# Patient Record
Sex: MALE | Race: AMERICAN INDIAN OR ALASKA NATIVE
[De-identification: names, ages, dates, MRNs, and addresses within clinical notes are randomized per-mention and may not be internally consistent; named-entity substitution may affect disease eponyms.]

---

## 2018-10-25 ENCOUNTER — HOSPITAL ENCOUNTER (OUTPATIENT)
Dept: HOSPITAL 5 - VAS | Age: 58
Discharge: HOME | End: 2018-10-25
Attending: PSYCHIATRY & NEUROLOGY
Payer: MEDICARE

## 2018-10-25 DIAGNOSIS — I65.23: Primary | ICD-10-CM

## 2018-10-25 PROCEDURE — 93880 EXTRACRANIAL BILAT STUDY: CPT

## 2018-10-26 NOTE — VASCULAR LAB REPORT
CAROTID DUPLEX STUDY:



RIGHT        PSVEDV

CCA PROX:[id number]

CCA DIST:[id number]

ICA PROX:[id number]

ICA MID:[id number]

ICA DIST:[id number]

ECA:         102

VERT:              41              15  



LEFT         PSVEDV

CCA PROX:[id number]

CCA DIST:[id number]

ICA PROX:[id number]

ICA MID:[id number]

ICA DIST:[id number]

ECA:               112

VERT:              36        9





REASON FOR EXAM: Carotid artery stenosis.

     

COMMENTS ON THE RIGHT:

Doppler frequency analysis is consistent with 16 to 49 percent diameter 

reduction of the internal carotid artery.  A small amount of mixed 

density plaque is seen.  The common carotid artery is patent. The 

external carotid artery is patent.  The vertebral artery has antegrade 

flow.



COMMENTS ON THE LEFT:

Doppler frequency analysis is consistent with 16 to 49 percent diameter 

reduction of the internal carotid artery.  A small amount of mixed 

density plaque is seen.  The common carotid artery is patent. The 

external carotid artery is patent.  The vertebral artery has antegrade 

flow.



IMPRESSION:

Less than 50% diameter reduction in the internal carotid arteries 

bilaterally.

A small amount of mixed density plaque seen in the internal carotid 

arteries bilaterally.

Recommend repeat carotid artery duplex in 12 months.

## 2022-04-30 ENCOUNTER — TELEPHONE ENCOUNTER (OUTPATIENT)
Dept: URBAN - METROPOLITAN AREA CLINIC 121 | Facility: CLINIC | Age: 62
End: 2022-04-30

## 2022-04-30 RX ORDER — TEA TREE OIL 100 %
SPRAY, NON-AEROSOL (ML) TOPICAL
OUTPATIENT
Start: 2012-11-28

## 2022-04-30 RX ORDER — CELECOXIB 50 MG/1
CAPSULE ORAL
OUTPATIENT
Start: 2012-11-28

## 2022-05-01 ENCOUNTER — TELEPHONE ENCOUNTER (OUTPATIENT)
Dept: URBAN - METROPOLITAN AREA CLINIC 121 | Facility: CLINIC | Age: 62
End: 2022-05-01

## 2022-05-01 RX ORDER — TEA TREE OIL 100 %
QD SPRAY, NON-AEROSOL (ML) TOPICAL
Status: ACTIVE | COMMUNITY
Start: 2012-12-12

## 2022-05-01 RX ORDER — CELECOXIB 50 MG/1
BID CAPSULE ORAL
Status: ACTIVE | COMMUNITY
Start: 2012-12-12

## 2024-11-08 ENCOUNTER — OFFICE VISIT (OUTPATIENT)
Dept: URBAN - METROPOLITAN AREA CLINIC 29 | Facility: CLINIC | Age: 64
End: 2024-11-08
Payer: MEDICARE

## 2024-11-08 ENCOUNTER — DASHBOARD ENCOUNTERS (OUTPATIENT)
Age: 64
End: 2024-11-08

## 2024-11-08 VITALS
BODY MASS INDEX: 23.19 KG/M2 | HEIGHT: 68 IN | HEART RATE: 52 BPM | SYSTOLIC BLOOD PRESSURE: 113 MMHG | WEIGHT: 153 LBS | DIASTOLIC BLOOD PRESSURE: 64 MMHG

## 2024-11-08 DIAGNOSIS — Z12.11 COLON CANCER SCREENING: ICD-10-CM

## 2024-11-08 DIAGNOSIS — R10.13 EPIGASTRIC PAIN: ICD-10-CM

## 2024-11-08 DIAGNOSIS — R10.32 LEFT LOWER QUADRANT PAIN: ICD-10-CM

## 2024-11-08 PROCEDURE — 99204 OFFICE O/P NEW MOD 45 MIN: CPT | Performed by: INTERNAL MEDICINE

## 2024-11-08 RX ORDER — CELECOXIB 50 MG/1
BID CAPSULE ORAL
Status: ACTIVE | COMMUNITY
Start: 2012-12-12

## 2024-11-08 RX ORDER — TEA TREE OIL 100 %
QD SPRAY, NON-AEROSOL (ML) TOPICAL
Status: ACTIVE | COMMUNITY
Start: 2012-12-12

## 2024-11-08 RX ORDER — SOD SULF/POT CHLORIDE/MAG SULF 1.479 G
12 TABLETS THE FIRST DOSE THE EVENING BEFORE AND SECOND DOSE THE MORNING OF COLONOSCOPY TABLET ORAL TWICE A DAY
Qty: 24 | Refills: 0 | OUTPATIENT
Start: 2024-11-09

## 2024-11-08 NOTE — HPI-TODAY'S VISIT:
Mr. Dean is a 64-year-old -American male who presents today with complaint of abdominal pain that he has been experiencing for the past couple weeks.  The pain is often in the epigastric area as well as experiencing some pain in the left lower quadrant area.  He denies any nausea or vomiting.  He denies the use of any new medications.  He often reports that the pain is worse with walking.  He also reports left mid to lower back discomfort.  He has normal bowel movements.  His last colonoscopy was normal more than 15 years ago.  He denies any family history of colon cancer or polyps.  His past medical history is significant for cervical spinal cord injury, COPD and anxiety/depression.

## 2024-11-09 ENCOUNTER — LAB OUTSIDE AN ENCOUNTER (OUTPATIENT)
Dept: URBAN - METROPOLITAN AREA CLINIC 29 | Facility: CLINIC | Age: 64
End: 2024-11-09

## 2024-11-09 PROBLEM — 79922009: Status: ACTIVE | Noted: 2024-11-09

## 2024-11-09 PROBLEM — 301716002: Status: ACTIVE | Noted: 2024-11-09

## 2024-11-09 PROBLEM — 305058001: Status: ACTIVE | Noted: 2024-11-09

## 2024-11-12 ENCOUNTER — CLAIMS CREATED FROM THE CLAIM WINDOW (OUTPATIENT)
Dept: URBAN - METROPOLITAN AREA SURGERY CENTER 7 | Facility: SURGERY CENTER | Age: 64
End: 2024-11-12
Payer: MEDICARE

## 2024-11-12 DIAGNOSIS — Z12.11 COLON CANCER SCREENING: ICD-10-CM

## 2024-11-12 DIAGNOSIS — K64.1 GRADE II INTERNAL HEMORRHOIDS: ICD-10-CM

## 2024-11-12 DIAGNOSIS — Z12.11 COLON CANCER SCREENING (HIGH RISK): ICD-10-CM

## 2024-11-12 PROCEDURE — G0121 COLON CA SCRN NOT HI RSK IND: HCPCS | Performed by: INTERNAL MEDICINE

## 2024-11-12 PROCEDURE — G0121 COLON CA SCRN NOT HI RSK IND: HCPCS | Performed by: CLINIC/CENTER

## 2024-11-12 PROCEDURE — 0528F RCMND FLW-UP 10 YRS DOCD: CPT | Performed by: CLINIC/CENTER

## 2024-11-12 PROCEDURE — 0528F RCMND FLW-UP 10 YRS DOCD: CPT | Performed by: INTERNAL MEDICINE

## 2024-11-12 PROCEDURE — 00812 ANES LWR INTST SCR COLSC: CPT | Performed by: NURSE ANESTHETIST, CERTIFIED REGISTERED

## 2024-11-12 PROCEDURE — 00811 ANES LWR INTST NDSC NOS: CPT | Performed by: NURSE ANESTHETIST, CERTIFIED REGISTERED

## 2024-11-12 PROCEDURE — 00731 ANES UPR GI NDSC PX NOS: CPT | Performed by: NURSE ANESTHETIST, CERTIFIED REGISTERED

## 2024-11-12 RX ORDER — CELECOXIB 50 MG/1
BID CAPSULE ORAL
Status: ACTIVE | COMMUNITY
Start: 2012-12-12

## 2024-11-12 RX ORDER — TEA TREE OIL 100 %
QD SPRAY, NON-AEROSOL (ML) TOPICAL
Status: ACTIVE | COMMUNITY
Start: 2012-12-12

## 2024-11-12 RX ORDER — SOD SULF/POT CHLORIDE/MAG SULF 1.479 G
12 TABLETS THE FIRST DOSE THE EVENING BEFORE AND SECOND DOSE THE MORNING OF COLONOSCOPY TABLET ORAL TWICE A DAY
Qty: 24 | Refills: 0 | Status: ACTIVE | COMMUNITY
Start: 2024-11-09

## 2024-11-18 ENCOUNTER — OFFICE VISIT (OUTPATIENT)
Dept: URBAN - METROPOLITAN AREA SURGERY CENTER 7 | Facility: SURGERY CENTER | Age: 64
End: 2024-11-18
Payer: MEDICARE

## 2024-11-18 ENCOUNTER — CLAIMS CREATED FROM THE CLAIM WINDOW (OUTPATIENT)
Dept: URBAN - METROPOLITAN AREA SURGERY CENTER 7 | Facility: SURGERY CENTER | Age: 64
End: 2024-11-18

## 2024-11-18 DIAGNOSIS — R10.13 ABDOMINAL PAIN, EPIGASTRIC: ICD-10-CM

## 2024-11-18 PROCEDURE — 43235 EGD DIAGNOSTIC BRUSH WASH: CPT | Performed by: INTERNAL MEDICINE

## 2024-11-18 PROCEDURE — 43235 EGD DIAGNOSTIC BRUSH WASH: CPT | Performed by: CLINIC/CENTER

## 2024-11-18 RX ORDER — SOD SULF/POT CHLORIDE/MAG SULF 1.479 G
12 TABLETS THE FIRST DOSE THE EVENING BEFORE AND SECOND DOSE THE MORNING OF COLONOSCOPY TABLET ORAL TWICE A DAY
Qty: 24 | Refills: 0 | Status: ACTIVE | COMMUNITY
Start: 2024-11-09

## 2024-11-18 RX ORDER — TEA TREE OIL 100 %
QD SPRAY, NON-AEROSOL (ML) TOPICAL
Status: ACTIVE | COMMUNITY
Start: 2012-12-12

## 2024-11-18 RX ORDER — CELECOXIB 50 MG/1
BID CAPSULE ORAL
Status: ACTIVE | COMMUNITY
Start: 2012-12-12

## 2024-12-05 ENCOUNTER — OFFICE VISIT (OUTPATIENT)
Dept: URBAN - METROPOLITAN AREA CLINIC 29 | Facility: CLINIC | Age: 64
End: 2024-12-05

## 2024-12-05 VITALS — HEIGHT: 68 IN

## 2024-12-05 RX ORDER — TEA TREE OIL 100 %
QD SPRAY, NON-AEROSOL (ML) TOPICAL
Status: ACTIVE | COMMUNITY
Start: 2012-12-12

## 2024-12-05 RX ORDER — SOD SULF/POT CHLORIDE/MAG SULF 1.479 G
12 TABLETS THE FIRST DOSE THE EVENING BEFORE AND SECOND DOSE THE MORNING OF COLONOSCOPY TABLET ORAL TWICE A DAY
Qty: 24 | Refills: 0 | Status: ACTIVE | COMMUNITY
Start: 2024-11-09

## 2024-12-05 RX ORDER — CELECOXIB 50 MG/1
BID CAPSULE ORAL
Status: ACTIVE | COMMUNITY
Start: 2012-12-12

## 2024-12-19 ENCOUNTER — OFFICE VISIT (OUTPATIENT)
Dept: URBAN - METROPOLITAN AREA CLINIC 29 | Facility: CLINIC | Age: 64
End: 2024-12-19